# Patient Record
Sex: FEMALE | Race: WHITE | NOT HISPANIC OR LATINO | Employment: PART TIME | URBAN - METROPOLITAN AREA
[De-identification: names, ages, dates, MRNs, and addresses within clinical notes are randomized per-mention and may not be internally consistent; named-entity substitution may affect disease eponyms.]

---
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
## 2018-01-23 ENCOUNTER — OFFICE VISIT - HEALTHEAST (OUTPATIENT)
Dept: FAMILY MEDICINE | Facility: CLINIC | Age: 18
End: 2018-01-23

## 2018-01-23 DIAGNOSIS — R63.2 INCREASED APPETITE: ICD-10-CM

## 2018-01-23 DIAGNOSIS — F50.819 BINGE EATING DISORDER: ICD-10-CM

## 2018-01-23 DIAGNOSIS — Z79.899 ON SSRI THERAPY: ICD-10-CM

## 2018-01-23 ASSESSMENT — MIFFLIN-ST. JEOR: SCORE: 1291.59

## 2018-02-20 ENCOUNTER — OFFICE VISIT - HEALTHEAST (OUTPATIENT)
Dept: FAMILY MEDICINE | Facility: CLINIC | Age: 18
End: 2018-02-20

## 2018-02-20 DIAGNOSIS — F50.819 BINGE EATING DISORDER: ICD-10-CM

## 2018-02-20 DIAGNOSIS — F32.A DEPRESSION, UNSPECIFIED DEPRESSION TYPE: ICD-10-CM

## 2018-02-20 DIAGNOSIS — F41.9 ANXIETY: ICD-10-CM

## 2018-02-21 ENCOUNTER — AMBULATORY - HEALTHEAST (OUTPATIENT)
Dept: FAMILY MEDICINE | Facility: CLINIC | Age: 18
End: 2018-02-21

## 2018-02-21 DIAGNOSIS — B07.0 PLANTAR WART: ICD-10-CM

## 2018-02-22 ENCOUNTER — OFFICE VISIT - HEALTHEAST (OUTPATIENT)
Dept: PSYCHOLOGY | Facility: CLINIC | Age: 18
End: 2018-02-22

## 2018-02-22 DIAGNOSIS — F41.1 GENERALIZED ANXIETY DISORDER: ICD-10-CM

## 2018-02-22 DIAGNOSIS — F50.819 BINGE EATING DISORDER: ICD-10-CM

## 2018-06-12 ENCOUNTER — OFFICE VISIT - HEALTHEAST (OUTPATIENT)
Dept: FAMILY MEDICINE | Facility: CLINIC | Age: 18
End: 2018-06-12

## 2018-06-12 DIAGNOSIS — B07.0 PLANTAR WARTS: ICD-10-CM

## 2018-06-12 ASSESSMENT — MIFFLIN-ST. JEOR: SCORE: 1346.02

## 2018-08-28 ENCOUNTER — OFFICE VISIT - HEALTHEAST (OUTPATIENT)
Dept: FAMILY MEDICINE | Facility: CLINIC | Age: 18
End: 2018-08-28

## 2018-08-28 DIAGNOSIS — L70.9 ACNE: ICD-10-CM

## 2018-08-28 DIAGNOSIS — F41.9 ANXIETY: ICD-10-CM

## 2018-08-28 DIAGNOSIS — R45.86 MOOD SWINGS: ICD-10-CM

## 2018-08-28 DIAGNOSIS — B07.0 PLANTAR WARTS: ICD-10-CM

## 2018-08-28 DIAGNOSIS — F32.A DEPRESSION, UNSPECIFIED DEPRESSION TYPE: ICD-10-CM

## 2018-08-28 DIAGNOSIS — Z30.09 ENCOUNTER FOR OTHER GENERAL COUNSELING OR ADVICE ON CONTRACEPTION: ICD-10-CM

## 2018-08-28 ASSESSMENT — MIFFLIN-ST. JEOR: SCORE: 1346.02

## 2018-09-20 ENCOUNTER — AMBULATORY - HEALTHEAST (OUTPATIENT)
Dept: FAMILY MEDICINE | Facility: CLINIC | Age: 18
End: 2018-09-20

## 2018-09-20 DIAGNOSIS — N30.01 ACUTE CYSTITIS WITH HEMATURIA: ICD-10-CM

## 2018-09-20 DIAGNOSIS — Z72.51 UNPROTECTED SEX: ICD-10-CM

## 2018-09-20 DIAGNOSIS — R82.90 CLOUDY URINE: ICD-10-CM

## 2018-09-20 DIAGNOSIS — B07.0 PLANTAR WARTS: ICD-10-CM

## 2018-09-20 DIAGNOSIS — Z23 NEED FOR VACCINATION: ICD-10-CM

## 2018-09-20 DIAGNOSIS — N89.8 VAGINAL DISCHARGE: ICD-10-CM

## 2018-09-20 LAB
ALBUMIN UR-MCNC: ABNORMAL MG/DL
APPEARANCE UR: CLEAR
BILIRUB UR QL STRIP: NEGATIVE
CLUE CELLS: NORMAL
COLOR UR AUTO: YELLOW
GLUCOSE UR STRIP-MCNC: NEGATIVE MG/DL
HGB UR QL STRIP: ABNORMAL
KETONES UR STRIP-MCNC: NEGATIVE MG/DL
LEUKOCYTE ESTERASE UR QL STRIP: ABNORMAL
NITRATE UR QL: NEGATIVE
PH UR STRIP: 7 [PH] (ref 5–8)
SP GR UR STRIP: 1.02 (ref 1–1.03)
TRICHOMONAS, WET PREP: NORMAL
UROBILINOGEN UR STRIP-ACNC: ABNORMAL
YEAST, WET PREP: NORMAL

## 2018-09-20 ASSESSMENT — MIFFLIN-ST. JEOR: SCORE: 1341.49

## 2018-09-21 ENCOUNTER — AMBULATORY - HEALTHEAST (OUTPATIENT)
Dept: FAMILY MEDICINE | Facility: CLINIC | Age: 18
End: 2018-09-21

## 2018-09-21 DIAGNOSIS — N30.01 ACUTE CYSTITIS WITH HEMATURIA: ICD-10-CM

## 2018-09-23 LAB — BACTERIA SPEC CULT: ABNORMAL

## 2018-11-02 ENCOUNTER — OFFICE VISIT - HEALTHEAST (OUTPATIENT)
Dept: FAMILY MEDICINE | Facility: CLINIC | Age: 18
End: 2018-11-02

## 2018-11-02 DIAGNOSIS — N39.0 UTI (URINARY TRACT INFECTION): ICD-10-CM

## 2018-11-02 DIAGNOSIS — R39.15 URINARY URGENCY: ICD-10-CM

## 2018-11-02 LAB
ALBUMIN UR-MCNC: ABNORMAL MG/DL
AMORPH CRY #/AREA URNS HPF: ABNORMAL /[HPF]
APPEARANCE UR: ABNORMAL
BACTERIA #/AREA URNS HPF: ABNORMAL HPF
BILIRUB UR QL STRIP: NEGATIVE
COLOR UR AUTO: YELLOW
GLUCOSE UR STRIP-MCNC: NEGATIVE MG/DL
HGB UR QL STRIP: ABNORMAL
KETONES UR STRIP-MCNC: NEGATIVE MG/DL
LEUKOCYTE ESTERASE UR QL STRIP: ABNORMAL
MUCOUS THREADS #/AREA URNS LPF: ABNORMAL LPF
NITRATE UR QL: NEGATIVE
PH UR STRIP: 7 [PH] (ref 5–8)
RBC #/AREA URNS AUTO: ABNORMAL HPF
SP GR UR STRIP: 1.02 (ref 1–1.03)
SQUAMOUS #/AREA URNS AUTO: ABNORMAL LPF
UROBILINOGEN UR STRIP-ACNC: ABNORMAL
WBC #/AREA URNS AUTO: ABNORMAL HPF

## 2018-11-04 LAB — BACTERIA SPEC CULT: ABNORMAL

## 2018-11-13 ENCOUNTER — COMMUNICATION - HEALTHEAST (OUTPATIENT)
Dept: FAMILY MEDICINE | Facility: CLINIC | Age: 18
End: 2018-11-13

## 2018-11-13 DIAGNOSIS — L70.9 ACNE: ICD-10-CM

## 2018-11-13 DIAGNOSIS — Z30.09 ENCOUNTER FOR OTHER GENERAL COUNSELING OR ADVICE ON CONTRACEPTION: ICD-10-CM

## 2018-11-13 DIAGNOSIS — R45.86 MOOD SWINGS: ICD-10-CM

## 2019-01-11 ENCOUNTER — OFFICE VISIT - HEALTHEAST (OUTPATIENT)
Dept: FAMILY MEDICINE | Facility: CLINIC | Age: 19
End: 2019-01-11

## 2019-01-11 DIAGNOSIS — N89.8 VAGINAL DISCHARGE: ICD-10-CM

## 2019-01-11 DIAGNOSIS — Z30.09 ENCOUNTER FOR OTHER GENERAL COUNSELING OR ADVICE ON CONTRACEPTION: ICD-10-CM

## 2019-01-11 LAB
CLUE CELLS: NORMAL
TRICHOMONAS, WET PREP: NORMAL
YEAST, WET PREP: NORMAL

## 2019-01-11 ASSESSMENT — MIFFLIN-ST. JEOR: SCORE: 1314.27

## 2019-01-14 LAB
C TRACH DNA SPEC QL PROBE+SIG AMP: NEGATIVE
N GONORRHOEA DNA SPEC QL NAA+PROBE: NEGATIVE

## 2019-04-15 ENCOUNTER — AMBULATORY - HEALTHEAST (OUTPATIENT)
Dept: FAMILY MEDICINE | Facility: CLINIC | Age: 19
End: 2019-04-15

## 2019-04-15 ENCOUNTER — COMMUNICATION - HEALTHEAST (OUTPATIENT)
Dept: FAMILY MEDICINE | Facility: CLINIC | Age: 19
End: 2019-04-15

## 2019-04-15 ENCOUNTER — OFFICE VISIT - HEALTHEAST (OUTPATIENT)
Dept: FAMILY MEDICINE | Facility: CLINIC | Age: 19
End: 2019-04-15

## 2019-04-15 DIAGNOSIS — J02.0 STREP THROAT: ICD-10-CM

## 2019-04-15 DIAGNOSIS — F50.819 BINGE EATING DISORDER: ICD-10-CM

## 2019-04-15 DIAGNOSIS — F41.9 ANXIETY: ICD-10-CM

## 2019-04-15 DIAGNOSIS — F32.A DEPRESSION, UNSPECIFIED DEPRESSION TYPE: ICD-10-CM

## 2019-04-15 DIAGNOSIS — J02.9 SORE THROAT: ICD-10-CM

## 2019-04-15 LAB — DEPRECATED S PYO AG THROAT QL EIA: ABNORMAL

## 2019-04-15 ASSESSMENT — MIFFLIN-ST. JEOR: SCORE: 1323.34

## 2019-11-10 ENCOUNTER — OFFICE VISIT (OUTPATIENT)
Dept: URGENT CARE | Facility: URGENT CARE | Age: 19
End: 2019-11-10
Payer: COMMERCIAL

## 2019-11-10 VITALS
TEMPERATURE: 98.3 F | RESPIRATION RATE: 18 BRPM | DIASTOLIC BLOOD PRESSURE: 80 MMHG | SYSTOLIC BLOOD PRESSURE: 110 MMHG | OXYGEN SATURATION: 99 % | HEART RATE: 89 BPM

## 2019-11-10 DIAGNOSIS — B08.4 HAND, FOOT AND MOUTH DISEASE: Primary | ICD-10-CM

## 2019-11-10 PROCEDURE — 99203 OFFICE O/P NEW LOW 30 MIN: CPT | Performed by: FAMILY MEDICINE

## 2019-11-10 NOTE — PATIENT INSTRUCTIONS
If you have any itching:  Consider using calamine lotion; antihistamines (benadryl is sedating and may be best for home/night time, claritin and zyrtec are non sedating); and, avoid hot water in your baths/showers.

## 2019-11-10 NOTE — PROGRESS NOTES
SUBJECTIVE:   Liv Pryor is a 19 year old female presenting with a chief complaint of itchy bumps to the hands, feet and a sore throat.    Started on 11/8 with sore throat and felt feverish.   Works in a .   No genital sores or lesions and no h/o syphilis.  Monogamous relationship.  No recent travels or tick bites.    ROS:  5 point review of symptoms negative other than positives stated above.    OBJECTIVE  /80 (BP Location: Right arm, Patient Position: Chair, Cuff Size: Adult Regular)   Pulse 89   Temp 98.3  F (36.8  C) (Oral)   Resp 18   SpO2 99%   GENERAL:  Awake, alert and interactive. No acute distress.  HEAD:  NC/AT, EOMI, conjunctiva clear, nose clear, oropharynx with raised red lesions to soft palate and posterior oropharynx.  No exudate.   SKIN:  Raised red papular lesions to palms and posterior hand and fingers and tops of the feet.   No pustules or vesicles.    ASSESSMENT/PLAN    ICD-10-CM    1. Hand, foot and mouth disease B08.4      We discussed the expected course of the infection and symptomatic cares in detail.   Advised to return to care if symptoms not improving as expected, do not resolve completely, or if any new or worsening symptoms develop.

## 2020-02-12 ENCOUNTER — OFFICE VISIT (OUTPATIENT)
Dept: URGENT CARE | Facility: URGENT CARE | Age: 20
End: 2020-02-12
Payer: COMMERCIAL

## 2020-02-12 VITALS
TEMPERATURE: 97.7 F | DIASTOLIC BLOOD PRESSURE: 60 MMHG | OXYGEN SATURATION: 98 % | HEART RATE: 98 BPM | SYSTOLIC BLOOD PRESSURE: 110 MMHG

## 2020-02-12 DIAGNOSIS — R07.0 THROAT PAIN: Primary | ICD-10-CM

## 2020-02-12 DIAGNOSIS — J02.0 STREP THROAT: ICD-10-CM

## 2020-02-12 PROBLEM — Z72.51 UNPROTECTED SEX: Status: ACTIVE | Noted: 2018-09-20

## 2020-02-12 PROBLEM — B07.0 PLANTAR WARTS: Status: ACTIVE | Noted: 2018-06-13

## 2020-02-12 PROBLEM — N91.2 AMENORRHEA: Status: ACTIVE | Noted: 2017-10-29

## 2020-02-12 PROBLEM — M54.50 ACUTE BILATERAL LOW BACK PAIN WITHOUT SCIATICA: Status: ACTIVE | Noted: 2017-10-29

## 2020-02-12 PROBLEM — R45.86 MOOD SWINGS: Status: ACTIVE | Noted: 2017-10-29

## 2020-02-12 PROBLEM — L70.9 ACNE: Status: ACTIVE | Noted: 2018-08-28

## 2020-02-12 PROBLEM — Z30.9 CONTRACEPTIVE MANAGEMENT: Status: ACTIVE | Noted: 2018-08-28

## 2020-02-12 PROBLEM — R82.90 CLOUDY URINE: Status: ACTIVE | Noted: 2018-09-20

## 2020-02-12 PROBLEM — M21.41 FLAT FEET, BILATERAL: Status: ACTIVE | Noted: 2017-10-29

## 2020-02-12 PROBLEM — R53.83 FATIGUE: Status: ACTIVE | Noted: 2017-10-29

## 2020-02-12 PROBLEM — F32.A DEPRESSED: Status: ACTIVE | Noted: 2017-10-29

## 2020-02-12 PROBLEM — F50.819 BINGE EATING DISORDER: Status: ACTIVE | Noted: 2018-01-23

## 2020-02-12 PROBLEM — R63.2 INCREASED APPETITE: Status: ACTIVE | Noted: 2018-01-23

## 2020-02-12 PROBLEM — Z79.899 ON SSRI THERAPY: Status: ACTIVE | Noted: 2018-01-23

## 2020-02-12 PROBLEM — M21.42 FLAT FEET, BILATERAL: Status: ACTIVE | Noted: 2017-10-29

## 2020-02-12 PROBLEM — F41.9 ANXIETY: Status: ACTIVE | Noted: 2017-10-29

## 2020-02-12 LAB
DEPRECATED S PYO AG THROAT QL EIA: ABNORMAL
SPECIMEN SOURCE: ABNORMAL

## 2020-02-12 PROCEDURE — 99213 OFFICE O/P EST LOW 20 MIN: CPT | Performed by: FAMILY MEDICINE

## 2020-02-12 PROCEDURE — 87880 STREP A ASSAY W/OPTIC: CPT | Performed by: FAMILY MEDICINE

## 2020-02-12 RX ORDER — AMOXICILLIN 875 MG
875 TABLET ORAL 2 TIMES DAILY
Qty: 20 TABLET | Refills: 0 | Status: SHIPPED | OUTPATIENT
Start: 2020-02-12 | End: 2020-02-22

## 2020-02-12 NOTE — PROGRESS NOTES
SUBJECTIVE:  Chief Complaint   Patient presents with     Urgent Care     Pharyngitis     Possible strep started last night- fever, sore throat, chills, HA, bilateral ear pain     Liv Pryor is a 19 year old female with a chief complaint of sore throat.  Onset of symptoms was 1 day(s) ago.    Course of illness: sudden onset, still present and constant.  Severity moderate  Current and Associated symptoms: ear pain bilateral, sore throat, headache and body aches  Treatment measures tried include Tylenol/Ibuprofen.  Predisposing factors include - works at - many children have been sick.    Declines influenza testing    No past medical history on file.  Patient Active Problem List   Diagnosis     Acne     Acute bilateral low back pain without sciatica     Amenorrhea     Anxiety     Binge eating disorder     Cloudy urine     Contraceptive management     Depressed     Fatigue     Flat feet, bilateral     Increased appetite     Mood swings     On SSRI therapy     Plantar warts     Unprotected sex         ALLERGIES:  Patient has no known allergies.    MEDs  No current outpatient medications on file prior to visit.  No current facility-administered medications on file prior to visit.       Social History     Tobacco Use     Smoking status: Former Smoker     Packs/day: 0.00     Types: Cigarettes     Smokeless tobacco: Never Used   Substance Use Topics     Alcohol use: Not on file     Family History:  Non-contributory,  No associated family health conditions    ROS:  CONSTITUTIONAL:  fever, chills,   INTEGUMENTARY/SKIN: NEGATIVE for worrisome rashes, or lesions  EYES: NEGATIVE for vision changes or irritation  GI: NEGATIVE for nausea, abdominal pain,  or change in bowel habits    OBJECTIVE:   /60 (BP Location: Right arm, Patient Position: Chair, Cuff Size: Adult Regular)   Pulse 98   Temp 97.7  F (36.5  C) (Tympanic)   SpO2 98%   GENERAL APPEARANCE: alert, moderate distress and cooperative  EYES: EOMI,   PERRL, conjunctiva clear  HENT: ear canals and TM's normal.  Nose normal.  Pharynx erythematous with some exudate noted.  NECK: supple, non-tender to palpation, no adenopathy noted  RESP: lungs clear to auscultation - no rales, rhonchi or wheezes  CV: regular rates and rhythm, normal S1 S2, no murmur noted  ABDOMEN:  soft, nontender, no HSM or masses and bowel sounds normal  SKIN: no suspicious lesions or rashes    Rapid Strep test is positive    ASSESSMENT:  Throat pain     - Rapid strep screen    Strep throat     - amoxicillin (AMOXIL) 875 MG tablet; Take 1 tablet (875 mg) by mouth 2 times daily for 10 days     Patient was counseled that to prevent spreading the strep infection that she should stay out of public places, work or school until she has completed 24 hours of antibiotic treatment     Symptomatic treat with gargles, lozenges, and OTC analgesic as needed. Follow-up with primary clinic if not improving.    Note for work

## 2020-02-12 NOTE — LETTER
[facility]  [address]  [facility] [address]  [phone]      February 12, 2020    RE:  Liv Pryor                                                                                                                                                       [id number] [facility] [address]            To whom it may concern:    Liv Pryor is under my professional care for    Throat pain  Strep throat.   She  may return to work with the following: No restrictions on or about 2/14/2020.          Sincerely,        Jinny Hathaway MD    [facility]

## 2020-02-12 NOTE — PATIENT INSTRUCTIONS
Patient Education     Pharyngitis: Strep (Confirmed)    You have had a positive test for strep throat. Strep throat is a contagious illness. It is spread by coughing, kissing or by touching others after touching your mouth or nose. Symptoms include throat pain that is worse with swallowing, aching all over, headache, and fever. It is treated with antibiotic medicine. This should help you start to feel better in 1 to 2 days.  Home care    Rest at home. Drink plenty of fluids to you won't get dehydrated.    No work or school for the first 2 days of taking the antibiotics. After this time, you will not be contagious. You can then return to school or work if you are feeling better.     Take antibiotic medicine for the full 10 days, even if you feel better. This is very important to ensure the infection is treated. It is also important to prevent medicine-resistant germs from developing. If you were given an antibiotic shot, you don't need any more antibiotics.    You may use acetaminophen or ibuprofen to control pain or fever, unless another medicine was prescribed for this. Talk with your healthcare provider before taking these medicines if you have chronic liver or kidney disease. Also talk with your healthcare provider if you have had a stomach ulcer or GI bleeding.    Throat lozenges or sprays help reduce pain. Gargling with warm saltwater will also reduce throat pain. Dissolve 1/2 teaspoon of salt in 1 glass of warm water. This may be useful just before meals.     Soft foods are OK. Don't eat salty or spicy foods.  Follow-up care  Follow up with your healthcare provider or our staff if you don't get better over the next week.  When to seek medical advice  Call your healthcare provider right away if any of these occur:    Fever of 100.4 F (38 C) or higher, or as directed by your healthcare provider    New or worsening ear pain, sinus pain, or headache    Painful lumps in the back of neck    Stiff neck    Lymph  nodes getting larger or becoming soft in the middle    You can't swallow liquids or you can't open your mouth wide because of throat pain    Signs of dehydration. These include very dark urine or no urine, sunken eyes, and dizziness.    Trouble breathing or noisy breathing    Muffled voice    Rash  Prevention  Here are steps you can take to help prevent an infection:    Keep good hand washing habits.    Don t have close contact with people who have sore throats, colds, or other upper respiratory infections.    Don t smoke, and stay away from secondhand smoke.  Date Last Reviewed: 11/1/2017 2000-2019 [employer]. [address] [place] [address], [place] [address]. All rights reserved. This information is not intended as a substitute for professional medical care. Always follow your healthcare professional's instructions.

## 2021-05-27 NOTE — PROGRESS NOTES
"1. Anxiety     2. Depression, unspecified depression type     3. Binge eating disorder     4. Sore throat  Rapid Strep A Screen- Throat Swab     PHQ-9: 14  ANGIE-7: 9    ASSESSMENT/PLAN:     The following are part of a depression follow up plan for the patient:  coping support assessment and emotional support assessment  The following high BMI interventions were performed this visit: encouragement to exercise and weight monitoring    1. Anxiety    -Continues to decline medication or counseling at this time    2. Depression, unspecified depression type    -Continues to decline medication or counseling at this time    3. Binge eating disorder    -Patient will start tracking the number of days a week she is binging, goal is to work on self-control around her trigger foods.    4. Sore throat    - Rapid Strep A Screen- Throat Swab      There are no Patient Instructions on file for this visit.  Medications Discontinued During This Encounter   Medication Reason     norgestimate-ethinyl estradiol (ESTARYLLA) 0.25-35 mg-mcg per tablet Therapy completed     Return in about 2 months (around 6/15/2019) for anxiety, depression. binge eating follow up      Maira Byrd NP          SUBJECTIVE:  Liv Pryor is a 18 y.o. female who presents for anxiety, depression and to discuss her binge eating.  She also reports sore throat today    Stressors include work and school.  She is working on work life balance.  She is working at a  and is working anywhere from 15-35 hours/week.  She is done with school for the summer in 3 weeks and is happy about this.  She continues to live at home with her parents.  She is no longer taking Prozac or [place] pram for her anxiety or depressive or binge eating disorder symptoms.  She felt like \"they did nothing for me \".  She has always been hesitant to attend counseling since I started caring for her close to 2 years ago, she states \"it just does not work with my schedule \".  Coping " "strategies include overeating, she finds herself binge eating again 1-3 times per week since stopping the medication.  She will eat to the point where physically makes her ill, she does not purge.  She has been seen and counseled by providers at the [facility] in the past but she is stopped going due to the intensity of the scheduling for behavioral appointments.  She felt that her binge eating was not to the point of where it was so extreme that she needed the counseling of the [facility].  She does not purge, she is just struggling with controlling her impulses around foods such as chips or sweets.  Is no longer exercising as much and needs to get back on track with this.  In the past, she has track her calories but finds that it is boring and has become more difficult for her because she is so on interested in doing so even though it does help her understand how much she is actually eating in a day.  Finds that she does binge eat on her days off of work more than the days she works.  Additional symptoms include chronic worry and feeling bad about herself.  We discussed goals of her anxiety and depression.  She is willing to start tracking how many days of the week she is binging and will work on self-control her on her trigger foods.  She states \"it is hard for me to control myself though when I am not buying the groceries because my mom buys all the foods that I like \".  Recommend that she think long and hard about going back to seeing a counselor as I feel it would be beneficial for her to learn some coping strategies and behavioral tactics in order to control her binge eating disorder.  In addition, this will also help manage her anxiety and depression off of the medication as she desires not to return back to the medication at this time.    Sore throat: Onset: 4 days.  Denies fever, chills, nausea or vomiting, no reports of diarrhea.  No recent travel, she is a non-smoker but she does work at a  " and is exposed to strep throat there.   Chief Complaint   Patient presents with     Follow-up     Med Check -          Patient Active Problem List   Diagnosis     Amenorrhea     Anxiety     Depressed     Mood swings     Flat feet, bilateral     Fatigue, unspecified type     Acute bilateral low back pain without sciatica     Binge eating disorder     On SSRI therapy     Increased appetite     Plantar warts     Encounter for other general counseling or advice on contraception     Acne     Vaginal discharge     Unprotected sex     Cloudy urine       No current outpatient medications on file.     No current facility-administered medications for this visit.        Social History     Tobacco Use   Smoking Status Never Smoker   Smokeless Tobacco Never Used       REVIEW OF SYSTEMS: Positive for mood swings, excessive sadness,  fatigue, anhedonia, irritability          TOBACCO USE:  Social History     Tobacco Use   Smoking Status Never Smoker   Smokeless Tobacco Never Used     Social History     Socioeconomic History     Marital status: Single     Spouse name: Not on file     Number of children: Not on file     Years of education: 13     Highest education level: Not on file   Occupational History     Occupation: student   Social Needs     Financial resource strain: Not on file     Food insecurity:     Worry: Not on file     Inability: Not on file     Transportation needs:     Medical: Not on file     Non-medical: Not on file   Tobacco Use     Smoking status: Never Smoker     Smokeless tobacco: Never Used   Substance and Sexual Activity     Alcohol use: No     Drug use: No     Sexual activity: Never     Comment: has never been sexually active   Lifestyle     Physical activity:     Days per week: Not on file     Minutes per session: Not on file     Stress: Not on file   Relationships     Social connections:     Talks on phone: Not on file     Gets together: Not on file     Attends [employer] service: Not on file     Active  member of club or organization: Not on file     Attends meetings of clubs or organizations: Not on file     Relationship status: Not on file     Intimate partner violence:     Fear of current or ex partner: Not on file     Emotionally abused: Not on file     Physically abused: Not on file     Forced sexual activity: Not on file   Other Topics Concern     Not on file   Social History Narrative     Not on file         OBJECTIVE:            Vitals:    04/15/19 0904   BP: 92/56   Pulse: 70   Resp: 12   Temp: 97.5  F (36.4  C)   SpO2: 99%     Weight: 138 lb (62.6 kg)    Wt Readings from Last 3 Encounters:   04/15/19 138 lb (62.6 kg)   01/11/19 136 lb (61.7 kg)   11/02/18 144 lb (65.3 kg)     Body mass index is 26.07 kg/m .        Physical Exam:  GENERAL APPEARANCE: A&A, NAD, well hydrated, well nourished, well groomed   HEAD: atraumatic, no deformity  EYES: PERRL, EOM's intact, no redness or swelling  EARS: TM's normal, gray with nl light reflex  NOSE: no post nasal drainage or thrush  MOUTH: without erythema, exudate or thrush  NECK: Supple, without lymphadenopathy, no thyroid mass, no JVD, no bruit  CV: RRR, no M/G/R   LUNGS: CTAB, normal respiratory effort  ABDOMEN: S&NT, no masses, no organomegaly, BS present x4   SKIN:  Normal skin turgor, no lesions/rashes, warm and dry   PSYCHIATRIC; flat affect, intermittent eye contact, difficult time expressing herself, memory intact

## 2021-05-27 NOTE — TELEPHONE ENCOUNTER
Patient informed of positive strep test today.  Antibiotics sent to her preferred pharmacy, we did discuss infection control, work note written and left at  for her.

## 2021-05-30 ENCOUNTER — RECORDS - HEALTHEAST (OUTPATIENT)
Dept: ADMINISTRATIVE | Facility: CLINIC | Age: 21
End: 2021-05-30

## 2021-05-31 VITALS — WEIGHT: 130 LBS | HEIGHT: 61 IN | BODY MASS INDEX: 24.55 KG/M2

## 2021-05-31 VITALS — HEIGHT: 61 IN | BODY MASS INDEX: 24.73 KG/M2 | WEIGHT: 131 LBS

## 2021-05-31 VITALS — WEIGHT: 127 LBS | HEIGHT: 61 IN | BODY MASS INDEX: 23.98 KG/M2

## 2021-05-31 VITALS — HEIGHT: 61 IN | BODY MASS INDEX: 24.17 KG/M2 | WEIGHT: 128 LBS

## 2021-06-01 VITALS — WEIGHT: 143 LBS | BODY MASS INDEX: 27 KG/M2 | HEIGHT: 61 IN

## 2021-06-01 VITALS — BODY MASS INDEX: 25.34 KG/M2 | WEIGHT: 134.1 LBS

## 2021-06-01 VITALS — WEIGHT: 132 LBS | BODY MASS INDEX: 24.94 KG/M2

## 2021-06-02 VITALS — HEIGHT: 61 IN | BODY MASS INDEX: 26.81 KG/M2 | WEIGHT: 142 LBS

## 2021-06-02 VITALS — BODY MASS INDEX: 25.68 KG/M2 | HEIGHT: 61 IN | WEIGHT: 136 LBS

## 2021-06-02 VITALS — WEIGHT: 144 LBS | BODY MASS INDEX: 27.21 KG/M2

## 2021-06-02 VITALS — HEIGHT: 61 IN | WEIGHT: 138 LBS | BODY MASS INDEX: 26.06 KG/M2

## 2021-06-13 NOTE — PROGRESS NOTES
Office Visit - New Patient   Liv Pryor   17 y.o.  female    Date of visit: 10/26/2017  Physician: Maira Byrd CNP     Assessment and Plan   1. Establishing care with new doctor, encounter for     2. Amenorrhea  Cortisol    Thyroid Stimulating Hormone (TSH)    Luteinizing Hormone (LH)    Follicle Stimulating Hormone (FSH)    Prolactin    Dehydroepiandrosterone Sulfate(DHEA-S)    Comprehensive Metabolic Panel   3. Anxiety  escitalopram oxalate (LEXAPRO) 10 MG tablet   4. Depressed  escitalopram oxalate (LEXAPRO) 10 MG tablet   5. Mood swings     6. Flat feet, bilateral  Ambulatory referral for Orthotics - [place]   7. Fatigue, unspecified type  Thyroid Stimulating Hormone (TSH)    Hemoglobin    Vitamin D, Total (25-Hydroxy)   8. Acute bilateral low back pain without sciatica  Urinalysis   9. Need for vaccination  Influenza, Seasonal,Quad Inj, 36+ MOS    CANCELED: Influenza, Seasonal,Quad Inj, 36+ MOS     Follow up in 4 weeks for anxiety and depression check. Patient initiated on Lexapro therapy today. Will notify her of any grossly abnormal labs by phone.     Body Mass Index was not assessed due to normal BMI.         Chief Complaint   Chief Complaint   Patient presents with     Menstrual Problem     missed periods, referral for orthodics and discuss mood swings     Establish Care     Transfer from [facility]        Patient Profile   Social History     Social History Narrative        Past Medical History   Patient Active Problem List   Diagnosis     Amenorrhea     Anxiety     Depressed     Mood swings     Flat feet, bilateral     Fatigue, unspecified type     Acute bilateral low back pain without sciatica       Past Surgical History  She has a past surgical history that includes none.     History of Present Illness   This 17 y.o. old who presents to establish care today.  Her mother is present during the visit.  Medical, family and surgical history reviewed with patient.  Medication list  "reconciled.  Patient is a recent high school graduate and is feeling increased stress and anxiety over the last 8 months because she is not sure what she wants to do for a career.  She has been having mood swings and her main concern is that she has not had a period in roughly 2 years.  She was previously taking MoNessa for regulating her periods, however she went off of this because of weight gain.  She has not had a period since going off of the oral contraceptive 2 years ago.  She is not sexually active nor has she ever been.  She has not experienced any drastic changes in her weight.  She denies any history of anorexia nervosa or bulimia.  She does work at the [employer] part-time as a  from 7:30 in the morning to 1 PM.  She is a full-time student and is taking her generals right now.  She has been experiencing dizziness, hot flashes and increased fatigue.  During our conversation, I asked her about anxiety and depression issues and she broke down and started crying.  She denies any thoughts of self-harm and she is requesting help with her anxiety and depression today because she is feeling overwhelmed with it all.   Her edson 7 score is 15 and PHQ 9 score 14 today.  We reviewed all of the available medications for treating anxiety and depression.  She is not interested in counseling at this time.  She would like to start medication and see how she feels.  She is requesting a referral to orthotics for her bilateral for flat feet.  She admits to having some lower bilateral back pain, she has no history of kidney stones and denies any urinary frequency or dysuria.  Blood pressure is borderline hypotensive, she appears \"spacey\" today.  It appears that she has several thoughts going through her mind at one time and is having a problem expressing herself.  She does not drink alcohol, she does not smoke cigarettes and she denies any illicit drug use.  She tells me that she does exercise regularly and she " "typically eats between 1500 and 1800 diego per day. She is not diabetic and denies having any issues previously with her thyroid. Multiple lab tests will be performed today.     Review of Systems: A comprehensive review of systems was negative except as noted.     Medications and Allergies   Current Outpatient Prescriptions   Medication Sig Dispense Refill     escitalopram oxalate (LEXAPRO) 10 MG tablet Take 1 tablet (10 mg total) by mouth daily. 30 tablet 2     No current facility-administered medications for this visit.      No Known Allergies     Family and Social History   Family History   Problem Relation Age of Onset     No Medical Problems Mother      No Medical Problems Father      No Medical Problems Sister      No Medical Problems Brother         Social History   Substance Use Topics     Smoking status: Never Smoker     Smokeless tobacco: Never Used     Alcohol use No        Physical Exam   General Appearance:   Flat affect, tearful, no acute distress, alert and oriented ×4    BP (!) 80/62  Pulse 92  Temp 97.4  F (36.3  C)  Resp 14  Ht 5' 1\" (1.549 m)  Wt 130 lb (59 kg)  LMP  (Approximate)  SpO2 100%  Breastfeeding? No Comment: over 2 yrs having a period  BMI 24.56 kg/m2      NECK: Neck appearance was normal. There were no neck masses and the thyroid was not enlarged.  RESPIRATORY: Breathing pattern was normal and the chest moved symmetrically.  Percussion/auscultatory percussion was normal.  Lung sounds were normal and there were no abnormal sounds.  CARDIOVASCULAR: Heart rate and rhythm were normal.  S1 and S2 were normal and there were no extra sounds or murmurs. Peripheral pulses in arms and legs were normal.  Jugular venous pressure was normal.  There was no peripheral edema.  GASTROINTESTINAL: The abdomen was normal in contour.  Bowel sounds were present.  Percussion detected no organ enlargement or tenderness.  Palpation detected no tenderness, mass, or enlarged organs.   PSYCHIATRIC:  Mood " and affect were normal and the patient had normal recent and remote memory. The patient's judgment and insight were normal. Delayed verbal responses due to feeling overwhelmed. Difficulty expressing herself today.          Additional Information   Review and/or order of clinical lab tests:  Review and/or order of radiology tests:  Review and/or order of medicine tests:  Discussion of test results with performing physician:  Decision to obtain old records and/or obtain history from someone other than the patient:  Review and summarization of old records and/or obtaining history from someone other than the patient and.or discussion of case with another health care provider:  Independent visualization of image, tracing or specimen itself:    Time: total time spent with the patient was 45 minutes of which >50% was spent in counseling and coordination of care     Maira Byrd, CNP  Family Nurse Practitioner  [facility]  [phone]

## 2021-06-14 NOTE — PROGRESS NOTES
"1. Anxiety  escitalopram oxalate (LEXAPRO) 10 MG tablet   2. Depressed  escitalopram oxalate (LEXAPRO) 10 MG tablet   3. Amenorrhea  Ambulatory referral to Obstetrics / Gynecology     Med list reconciled    ASSESSMENT/PLAN:     The following are part of a depression follow up plan for the patient:  coping support assessment and emotional support assessment    1. Anxiety    -ANGIE-7: 13  - escitalopram oxalate (LEXAPRO) 10 MG tablet; Take 2 tablets (20 mg total) by mouth daily.  Dispense: 60 tablet; Refill: 2    2. Depressed    -PHQ-9: 16  - escitalopram oxalate (LEXAPRO) 10 MG tablet; Take 2 tablets (20 mg total) by mouth daily.  Dispense: 60 tablet; Refill: 2    3. Amenorrhea    - Ambulatory referral to Obstetrics / Gynecology    There are no Patient Instructions on file for this visit.  Medications Discontinued During This Encounter   Medication Reason     escitalopram oxalate (LEXAPRO) 10 MG tablet Reorder     Return to clinic in 4 weeks for next anxiety and depression follow-up.  Patient's Lexapro dose increased to 20 mg daily today.  The visit lasted a total of 20 minutes face to face with the patient.  Over 50% of the time spent counseling and educating the patient about above content.      Maira Byrd NP          SUBJECTIVE:  Liv Pryor is a 17 y.o. female who presents for anxiety and depression follow-up since starting Lexapro medication.  She continues to experience chronic worry, increased insecurities, depression, increased anxiety and is feeling frustrated about where she is at life.  She has no thoughts of self-harm.  Patient graduated high school and is struggling to figure out what she would like to do for a career long term.  I did explain to her that she does not need to have all of this figured out right now.  We discussed introducing therapy sessions into her regimen, patient refused to see a therapist at this time because she states she is \"too stubborn \"to listen to any of the " recommendations.  She did express her concern regarding her inattentiveness, fatigue and lack of drive.  Explained to patient that she should be evaluated by OB/GYN to figure out why she is not having periods.  Also informed  her that I would like to avoid any medication such as Adderall for her inattention.  She did find her old MonoNessa prescription at home, it has been 1 year since her last period, not 2 years as she had expressed in her recent visit with me.  Regardless of when her last period was,  I explained to her that we still need to figure out why she is not having a period every month.  Most of her lab work that was previously performed did come back with normal findings.  Patient did agree to increasing her Lexapro dose to 20 mg daily today.  She will return to the clinic in 4 weeks for her next follow-up.  Chief Complaint   Patient presents with     Follow-up     anxiety          Patient Active Problem List   Diagnosis     Amenorrhea     Anxiety     Depressed     Mood swings     Flat feet, bilateral     Fatigue, unspecified type     Acute bilateral low back pain without sciatica       Current Outpatient Prescriptions   Medication Sig Dispense Refill     escitalopram oxalate (LEXAPRO) 10 MG tablet Take 2 tablets (20 mg total) by mouth daily. 60 tablet 2     No current facility-administered medications for this visit.        History   Smoking Status     Never Smoker   Smokeless Tobacco     Never Used       REVIEW OF SYSTEMS: Excessive worries, restlessness, on edge, easily fatigues, irritability, disturbed sleep, decreased concentration, muscle tension, fear of health.      TOBACCO USE:  History   Smoking Status     Never Smoker   Smokeless Tobacco     Never Used     Social History     Social History     Marital status: Single     Spouse name: N/A     Number of children: N/A     Years of education: 13     Occupational History     student      Social History Main Topics     Smoking status: Never Smoker      Smokeless tobacco: Never Used     Alcohol use No     Drug use: No     Sexual activity: No      Comment: has never been sexually active     Other Topics Concern     Not on file     Social History Narrative         OBJECTIVE:            Vitals:    11/28/17 1151   BP: 90/60   Pulse: (!) 58   Resp: 14   Temp: 97.7  F (36.5  C)   SpO2: 100%     Weight: 128 lb (58.1 kg)  Wt Readings from Last 3 Encounters:   11/28/17 128 lb (58.1 kg) (52 %, Z= 0.04)*   10/26/17 130 lb (59 kg) (56 %, Z= 0.15)*     * Growth percentiles are based on [facility] 2-20 Years data.     Body mass index is 24.19 kg/(m^2).        Physical Exam:  GENERAL APPEARANCE: A&A, NAD, well hydrated, well nourished  NECK: Supple, without lymphadenopathy, no thyroid mass, no JVD, no bruit  CV: RRR, no M/G/R   LUNGS: CTAB, normal respiratory effort  ABDOMEN: S&NT, no masses, no organomegaly, BS present x4   SKIN:  Normal skin turgor, no lesions/rashes , warm and dry  PSYCHIATRIC;  memory intact, flat affect, wide-eyed, delayed responses, eye contact, difficulty expressing how she feels

## 2021-06-15 NOTE — PROGRESS NOTES
"1. Increased appetite     2. On SSRI therapy     3. Binge eating disorder  FLUoxetine (PROZAC) 20 MG capsule     Med list reconciled    ASSESSMENT/PLAN:     Body Mass Index was not assessed due to normal BMI.    1. Increased appetite      2. On SSRI therapy      3. Binge eating disorder    - FLUoxetine (PROZAC) 20 MG capsule; Take 1 capsule (20 mg total) by mouth daily.  Dispense: 30 capsule; Refill: 2    There are no Patient Instructions on file for this visit.  Medications Discontinued During This Encounter   Medication Reason     escitalopram oxalate (LEXAPRO) 10 MG tablet Therapy completed     No Follow-up on file.    The visit lasted a total of 25 minutes face to face with the patient.  Over 50% of the time spent counseling and educating the patient about above content.      Maira Byrd NP          SUBJECTIVE:  Liv Pryor is a 17 y.o. female who presents to discuss her overeating habits.  Onset: Childhood.  Patient continues to have spurts 1-3 days per week where she will eat excess amount of food until she feels physically ill.  Typically, on an average day, she eats breakfast lunch and dinner with 2 snacks in between.  She does not throw up, she has no history of anorexia or bulimia.  She states on average, she does binge eat roughly 1 day per week with the highest number being 3 days per week.  She typically eats all of her meals at the table or in her bedroom.  As a child, she does remember hiding and hoarding food.  She does not feel that her binge eating episodes are related to how she is feeling in regards to her anxiety and depression, she tells me that she just does not know when she should stop eating and that she \"just really enjoys eating\".  She feels obligated to finish an entire meal even if the servings on the box state it is for 4 people.  Her mother has been increasigly irritated with her because she eats such large quantities of food after her mother has just been grocery " shopping.  She does not purposely induce vomiting, nor has she vomited post-meal up to this point.  We did discuss options for treating binge eating disorders, she is currently on an SSRI at this time (Lexapro 10 mg daily).  Research does suggest Prozac.  Patient is open to trying a new medication today to see if this reduces the number of binging episodes that she has been experiencing. She continues to refuse nutrition therapy or mental health counseling at this time.  She states she has seen a nutritionist in the past for her binge eating.  Weight is stable today, she has gained 3 pounds since being on SSRI therapy.  She is not exercising. All other vitals are within normal range. Follow up in 4 weeks to evaluate effectiveness of Prozac medication, will re-introduce counseling at follow up visit.   Chief Complaint   Patient presents with     Follow-up     Constant hunger even after being full - ongoing for awhile         Patient Active Problem List   Diagnosis     Amenorrhea     Anxiety     Depressed     Mood swings     Flat feet, bilateral     Fatigue, unspecified type     Acute bilateral low back pain without sciatica     Binge eating disorder     On SSRI therapy     Increased appetite       Current Outpatient Prescriptions   Medication Sig Dispense Refill     norgestimate-ethinyl estradiol (MONONESSA, 28,) 0.25-35 mg-mcg per tablet Take 1 tablet by mouth daily. 84 tablet 3     FLUoxetine (PROZAC) 20 MG capsule Take 1 capsule (20 mg total) by mouth daily. 30 capsule 2     No current facility-administered medications for this visit.        History   Smoking Status     Never Smoker   Smokeless Tobacco     Never Used       REVIEW OF SYSTEMS: Excessive worries, restlessness, on edge, easily fatigues, irritability, disturbed sleep, decreased concentration, muscle tension.      TOBACCO USE:  History   Smoking Status     Never Smoker   Smokeless Tobacco     Never Used     Social History     Social History     Marital  status: Single     Spouse name: N/A     Number of children: N/A     Years of education: 13     Occupational History     student      Social History Main Topics     Smoking status: Never Smoker     Smokeless tobacco: Never Used     Alcohol use No     Drug use: No     Sexual activity: No      Comment: has never been sexually active     Other Topics Concern     Not on file     Social History Narrative         OBJECTIVE:            Vitals:    01/23/18 1225   BP: 106/70   Pulse: 84   Resp: 14   Temp: 97.9  F (36.6  C)   SpO2: 99%     Weight: 131 lb (59.4 kg)  Wt Readings from Last 3 Encounters:   01/23/18 131 lb (59.4 kg) (57 %, Z= 0.16)*   12/26/17 127 lb (57.6 kg) (50 %, Z= -0.01)*   11/28/17 128 lb (58.1 kg) (52 %, Z= 0.04)*     * Growth percentiles are based on [facility] 2-20 Years data.     Body mass index is 24.75 kg/(m^2).        Physical Exam:  GENERAL APPEARANCE: A&A, NAD, well hydrated, well nourished  NECK: Supple, without lymphadenopathy, no thyroid mass, no JVD, no bruit  CV: RRR, no M/G/R   LUNGS: CTAB, normal respiratory effort  ABDOMEN: S&NT, no masses, no organomegaly, BS present x4   SKIN:  Normal skin turgor, no lesions/rashes, warm and dry   PSYCHIATRIC;  Mood appropriate, memory intact, good eye contact, engaged in conversation

## 2021-06-15 NOTE — PROGRESS NOTES
1. Anxiety  escitalopram oxalate (LEXAPRO) 10 MG tablet   2. Depressed  escitalopram oxalate (LEXAPRO) 10 MG tablet   3. Encounter for menstrual regulation  norgestimate-ethinyl estradiol (MONONESSA, 28,) 0.25-35 mg-mcg per tablet     Med list reconciled    ASSESSMENT/PLAN:     The following are part of a depression follow up plan for the patient:  coping support assessment and emotional support assessment    1. Anxiety    - escitalopram oxalate (LEXAPRO) 10 MG tablet; Take 1 tablet (10 mg total) by mouth daily.  Dispense: 90 tablet; Refill: 1    2. Depressed    - escitalopram oxalate (LEXAPRO) 10 MG tablet; Take 1 tablet (10 mg total) by mouth daily.  Dispense: 90 tablet; Refill: 1    3. Encounter for menstrual regulation    - norgestimate-ethinyl estradiol (MONONESSA, 28,) 0.25-35 mg-mcg per tablet; Take 1 tablet by mouth daily.  Dispense: 84 tablet; Refill: 3    There are no Patient Instructions on file for this visit.  Medications Discontinued During This Encounter   Medication Reason     escitalopram oxalate (LEXAPRO) 10 MG tablet Dose adjustment     escitalopram oxalate (LEXAPRO) 10 MG tablet Reorder     Return to clinic in 6 months for next anxiety and depression follow-up.  Patient will continue on the 10 mg dose of Lexapro daily.  The visit lasted a total of 25 minutes face to face with the patient.  Over 50% of the time spent counseling and educating the patient about above content.      Maira Byrd NP          SUBJECTIVE:  Liv Pryor is a 17 y.o. female presents for follow-up for her anxiety and depression since initiating Lexapro therapy.  Patient was placed initially on 10 mg of Lexapro over 8 weeks ago.  She did return for follow-up 4 weeks ago and felt that the 10 mg dose was not doing well for her.  Orders were placed to increase her to the 20 mg dose daily.  She did run into issues with her insurance coverage where they would only cover the cost of the 10 mg daily dose.  Patient  continued to take the 10 mg dose daily instead of the 20 mg dose and she would like to continue with the 10 mg dose only due to her insurance coverage.  Overall, she feels that her mood is much improved, she feels she is socializing more with friends, her appetite is stable and she is experiencing less issues with insomnia.  She feels she is not worrying as much about school issues.  She has no thoughts of self-harm today, she is more laid back and is smiling today throughout the visit.  We did review her previous depression and anxiety screening results, PHQ 9 score is 13 today, previously 16.  Torres 7 score today is 11, previously 13.  She was also inquiring about restarting her MonoNessa oral birth control.  She was previously taking this medication several months ago and then stopped.  It was after that time that she ran into having issues with amenorrhea and feeling more depressed.  She had several lab studies performed to rule out any hormonal imbalances.  She was evaluated by GYN who stated that this was normal for her to experience this and did not feel that further testing was necessary at this time.  She did get a period this month on the 13th, she states that it was light flow and lasted 4 days.  I did discuss the role of estrogen regarding menstrual regulation and mood control, patient will continue with her MonoNessa starting today.  Chief Complaint   Patient presents with     Follow-up     Med discussion         Patient Active Problem List   Diagnosis     Amenorrhea     Anxiety     Depressed     Mood swings     Flat feet, bilateral     Fatigue, unspecified type     Acute bilateral low back pain without sciatica       Current Outpatient Prescriptions   Medication Sig Dispense Refill     escitalopram oxalate (LEXAPRO) 10 MG tablet Take 1 tablet (10 mg total) by mouth daily. 90 tablet 1     norgestimate-ethinyl estradiol (MONONESSA, 28,) 0.25-35 mg-mcg per tablet Take 1 tablet by mouth daily. 84 tablet 3      No current facility-administered medications for this visit.        History   Smoking Status     Never Smoker   Smokeless Tobacco     Never Used       REVIEW OF SYSTEMS: Denies excessive worries, restlessness, on edge, easily fatigues, irritability, disturbed sleep, decreased concentration, muscle tension, fear of health.      TOBACCO USE:  History   Smoking Status     Never Smoker   Smokeless Tobacco     Never Used     Social History     Social History     Marital status: Single     Spouse name: N/A     Number of children: N/A     Years of education: 13     Occupational History     student      Social History Main Topics     Smoking status: Never Smoker     Smokeless tobacco: Never Used     Alcohol use No     Drug use: No     Sexual activity: No      Comment: has never been sexually active     Other Topics Concern     Not on file     Social History Narrative         OBJECTIVE:            Vitals:    12/26/17 1222   BP: 92/52   Pulse: 78   Resp: 14   Temp: 97.3  F (36.3  C)   SpO2: 100%     Weight: 127 lb (57.6 kg)  Wt Readings from Last 3 Encounters:   12/26/17 127 lb (57.6 kg) (50 %, Z= -0.01)*   11/28/17 128 lb (58.1 kg) (52 %, Z= 0.04)*   10/26/17 130 lb (59 kg) (56 %, Z= 0.15)*     * Growth percentiles are based on CDC 2-20 Years data.     Body mass index is 24 kg/(m^2).        Physical Exam:  GENERAL APPEARANCE: A&A, NAD, well hydrated, well nourished  NECK: Supple, without lymphadenopathy, no thyroid mass, no JVD, no bruit  CV: RRR, no M/G/R, no edema   LUNGS: CTAB, normal respiratory effort  ABDOMEN: S&NT, no masses, no organomegaly, BS present x4   SKIN:  Normal skin turgor, no lesions/rashes, warm and dry   PSYCHIATRIC;  Mood appropriate, memory intact, good eye contact, engaged in conversation, smiling

## 2021-06-16 NOTE — PROGRESS NOTES
1. Anxiety  FLUoxetine (PROZAC) 20 MG capsule    Ambulatory Referral to Integrated Primary Care/Mental Health   2. Binge eating disorder  FLUoxetine (PROZAC) 20 MG capsule    Ambulatory Referral to Integrated Primary Care/Mental Health   3. Depression, unspecified depression type  FLUoxetine (PROZAC) 20 MG capsule    Ambulatory Referral to [facility]/Mental Health     Med list reconciled      ASSESSMENT/PLAN:     The following are part of a depression follow up plan for the patient:  coping support assessment and emotional support assessment    1. Binge eating disorder    - FLUoxetine (PROZAC) 20 MG capsule; Take 1 capsule (20 mg total) by mouth daily.  Dispense: 90 capsule; Refill: 1  - Ambulatory Referral to Integrated Primary Care/Mental Health    2. Anxiety    - FLUoxetine (PROZAC) 20 MG capsule; Take 1 capsule (20 mg total) by mouth daily.  Dispense: 90 capsule; Refill: 1  - Ambulatory Referral to Integrated Primary Care/Mental Health    3. Depression, unspecified depression type    - FLUoxetine (PROZAC) 20 MG capsule; Take 1 capsule (20 mg total) by mouth daily.  Dispense: 90 capsule; Refill: 1  - Ambulatory Referral to [facility] Primary [facility]/Mental Health      There are no Patient Instructions on file for this visit.  Medications Discontinued During This Encounter   Medication Reason     FLUoxetine (PROZAC) 20 MG capsule Reorder     Return to clinic in 6 months for next anxiety and depression follow-up.  Orders placed for patient to begin counseling.    The visit lasted a total of 25 minutes face to face with the patient.  Over 50% of the time spent counseling and educating the patient about above content.      Maira Byrd NP          SUBJECTIVE:  Liv Pryor is a 17 y.o. female who presents for follow-up for anxiety, depression and intermittent binge eating.  Previously was taking Lexapro 10 mg daily, medication discontinued and patient initiated on fluoxetine 20 mg daily.  She  states she feels much better on the current fluoxetine dosage.  Her mood is much more improved, she is able to control her binge eating and feels less irritable.  She has no difficulty with falling asleep, she does wake up several times during the night.  She denies any dry mouth, GI symptoms increased anxiety or depression or thoughts of self-harm.  We did discuss options for introducing mental health counseling into her regimen.  Patient is finally open to this, orders placed for [place] mental health counseling today. Vital are stable. ANGIE-7: 7, PHQ-9: 10 today, improved from previous scores.   Chief Complaint   Patient presents with     Medication Management         Patient Active Problem List   Diagnosis     Amenorrhea     Anxiety     Depressed     Mood swings     Flat feet, bilateral     Fatigue, unspecified type     Acute bilateral low back pain without sciatica     Binge eating disorder     On SSRI therapy     Increased appetite       Current Outpatient Prescriptions   Medication Sig Dispense Refill     FLUoxetine (PROZAC) 20 MG capsule Take 1 capsule (20 mg total) by mouth daily. 90 capsule 1     norgestimate-ethinyl estradiol (MONONESSA, 28,) 0.25-35 mg-mcg per tablet Take 1 tablet by mouth daily. 84 tablet 3     No current facility-administered medications for this visit.        History   Smoking Status     Never Smoker   Smokeless Tobacco     Never Used       REVIEW OF SYSTEMS: Decrease in excessive worries, restlessness, on edge, easily fatigues, irritability, disturbed sleep, decreased concentration, muscle tension, fear of health.      TOBACCO USE:  History   Smoking Status     Never Smoker   Smokeless Tobacco     Never Used     Social History     Social History     Marital status: Single     Spouse name: N/A     Number of children: N/A     Years of education: 13     Occupational History     student      Social History Main Topics     Smoking status: Never Smoker     Smokeless tobacco: Never Used      Alcohol use No     Drug use: No     Sexual activity: No      Comment: has never been sexually active     Other Topics Concern     Not on file     Social History Narrative         OBJECTIVE:            Vitals:    02/20/18 1252   BP: 98/60   Pulse: 80   Resp: 16     Weight: 132 lb (59.9 kg)  Wt Readings from Last 3 Encounters:   02/20/18 132 lb (59.9 kg) (58 %, Z= 0.20)*   01/23/18 131 lb (59.4 kg) (57 %, Z= 0.16)*   12/26/17 127 lb (57.6 kg) (50 %, Z= -0.01)*     * Growth percentiles are based on [facility] 2-20 Years data.     Body mass index is 24.94 kg/(m^2).        Physical Exam:  GENERAL APPEARANCE: A&A, NAD, well hydrated, well nourished  NECK: Supple, without lymphadenopathy, no thyroid mass, no JVD, no bruit  CV: RRR, no M/G/R   LUNGS: CTAB, normal respiratory effort  ABDOMEN: S&NT, no masses, no organomegaly, BS present x4   SKIN:  Normal skin turgor, no lesions/rashes, warm and dry   PSYCHIATRIC;  Mood appropriate, memory intact, good eye contact, engaged in conversation

## 2021-06-16 NOTE — PROGRESS NOTES
1. Plantar wart         ASSESSMENT/PLAN:     The following high BMI interventions were performed this visit: encouragement to exercise and weight monitoring    1. Plantar wart    -wart removal performed in clinic    Return to clinic for repeat wart removal if she does not feel the wart is gone in the next 4-6 weeks.    The visit lasted a total of 40 minutes face to face with the patient.  Over 50% of the time spent counseling and educating the patient about above content.      Maira Byrd NP          SUBJECTIVE:  Liv Pryor is a 17 y.o. female who presents for removal of her plantar wart.  Plantar wart located on the right foot plantar service that initially started 5 years ago.  She states the wart has been persistent and has remained the same size.  She does note an achy sensation when she applies direct pressure to the wart.  She has no difficulty with ambulation and she does not experience any pain when she is ambulating or wearing shoe.  Relieving factors: None, she has not tried any over-the-counter topical medications for her plantar wart.  She is rating her wart pain is 0 out of 10 today.  Wart debrided with a #15 scalpel, liquid nitrogen applied to plantar wart ×6.  Total of 2 warts debrided and frozen today.  Vital signs are stable, she does have a low-grade temperature today.  Site was covered at completion of wart removal.  She should return to the clinic in 4-6 weeks if wart is persistent.  Chief Complaint   Patient presents with     Procedure     Wart Removal         Patient Active Problem List   Diagnosis     Amenorrhea     Anxiety     Depressed     Mood swings     Flat feet, bilateral     Fatigue, unspecified type     Acute bilateral low back pain without sciatica     Binge eating disorder     On SSRI therapy     Increased appetite       Current Outpatient Prescriptions   Medication Sig Dispense Refill     FLUoxetine (PROZAC) 20 MG capsule Take 1 capsule (20 mg total) by mouth daily. 90  capsule 1     norgestimate-ethinyl estradiol (MONONESSA, 28,) 0.25-35 mg-mcg per tablet Take 1 tablet by mouth daily. 84 tablet 3     No current facility-administered medications for this visit.        History   Smoking Status     Never Smoker   Smokeless Tobacco     Never Used       REVIEW OF SYSTEMS: Denies trauma, locking, clicking, giving away, fevers, chills, sweating, rash or warmth.      TOBACCO USE:  History   Smoking Status     Never Smoker   Smokeless Tobacco     Never Used     Social History     Social History     Marital status: Single     Spouse name: N/A     Number of children: N/A     Years of education: 13     Occupational History     student      Social History Main Topics     Smoking status: Never Smoker     Smokeless tobacco: Never Used     Alcohol use No     Drug use: No     Sexual activity: No      Comment: has never been sexually active     Other Topics Concern     Not on file     Social History Narrative         OBJECTIVE:            Vitals:    02/21/18 1258   BP: 104/60   Pulse: 78   Resp: 18   Temp: 99.2  F (37.3  C)     Weight: 134 lb 1.6 oz (60.8 kg)  Wt Readings from Last 3 Encounters:   02/21/18 134 lb 1.6 oz (60.8 kg) (61 %, Z= 0.29)*   02/20/18 132 lb (59.9 kg) (58 %, Z= 0.20)*   01/23/18 131 lb (59.4 kg) (57 %, Z= 0.16)*     * Growth percentiles are based on CDC 2-20 Years data.     Body mass index is 25.34 kg/(m^2).        Physical Exam:  GENERAL APPEARANCE: A&A, NAD, well hydrated, well nourished  NECK: Supple, without lymphadenopathy, no thyroid mass, no JVD, no bruit  CV: RRR, no M/G/R , no edema  LUNGS: CTAB, normal respiratory effort  ABDOMEN: S&NT, no masses, no organomegaly, BS present x4   EXTREMITY: Right foot plantar surface with 0.5 x 0.5 cm plantar wart, wart debrided and frozen.  No redness or swelling around the area, she is able to ambulate without difficulty  SKIN:  Normal skin turgor, no lesions/rashes, warm and dry

## 2021-06-19 NOTE — LETTER
Letter by Maira Byrd NP at      Author: Maira Byrd NP Service: -- Author Type: --    Filed:  Encounter Date: 4/15/2019 Status: (Other)         April 15, 2019     Patient: Liv Pryor   YOB: 2000   Date of Visit: 4/15/2019       To Whom It May Concern:    It is my medical opinion that Liv Pryor may return to work on Wednesday April 17, 2019 no restrictions.  Liv is unable to return to work until 04/17/19 due to strep throat infection. She was seen in clinic and started on antibiotic therapy. She is infectious until she is on the medication for a full 24 hours.     If you have any questions or concerns, please don't hesitate to call.    Sincerely,        Electronically signed by Maira Byrd NP

## 2021-06-20 NOTE — PROGRESS NOTES
1. Cloudy urine  Urinalysis Macroscopic   2. Unprotected sex  CANCELED: Chlamydia trachomatis & Neisseria gonorrhoeae, Amplified Detection    CANCELED: Pregnancy, Urine   3. Vaginal discharge  Wet Prep, Vaginal   4. Plantar warts     5. Need for vaccination  Influenza, Seasonal,Quad Inj, 36+ MOS   6. Acute cystitis with hematuria  Culture, Urine         ASSESSMENT/PLAN:     The following high BMI interventions were performed this visit: encouragement to exercise and weight monitoring    1. Cloudy urine    - Urinalysis Macroscopic    2. Unprotected sex    -declined pregnancy and STD testing today, currently mestruating    3. Vaginal discharge    - Wet Prep, Vaginal    4. Plantar warts    -debrided and frozen today, see note    5. Need for vaccination    - Influenza, Seasonal,Quad Inj, 36+ MOS    There are no Patient Instructions on file for this visit.  There are no discontinued medications.  Return if symptoms worsen or fail to improve, for vaginitis.    The visit lasted a total of 40 minutes face to face with the patient.  Over 50% of the time spent counseling and educating the patient about above content.      Maira Byrd NP          SUBJECTIVE:  Liv Pryor is a 18 y.o. female who presents for removal and freezing of her plantar warts on the right foot.  This will be the third having this procedure, plantar wart is extremely large, nickel sized.  She continues to have mild discomfort with ambulation and wearing shoes.  Wart is extremely thick, site was debrided using a 15 blade scalpel, site was prepped prior to debridement with alcohol.  Site was frozen ×6 using liquid nitrogen, mild bleeding noted, patient tolerated procedure without difficulty.     Patient recently engaged in sexual intercourse for the first time with a male partner.  They did not use protection.  She is not concerned about pregnancy, she is currently menstruating.  She declined STD testing today.  The male partner has had other  partners in the past.  Since engaging in intercourse, she has been experiencing some vaginal discharge with odor for the last several days.  She is unsure if this is related to her recent intercourse or possible UTI since she has been experiencing some mild dysuria and cloudy urine.  Vaginal discharge with odor has a fishy scent to it.  She had recently picked up her oral contraception and had been on the pill for 1 week prior to engaging in sexual intercourse.  First day of her last period was September 14, 2018 and she continues to have bleeding today.  Denies any fatigue, nausea or breast tenderness today.  Her vital signs are stable.  She would like her influenza vaccination today.  Chief Complaint   Patient presents with     Procedure     Wart RT foot     Urinary Tract Infection     x2 days - ordor, cloudy         Patient Active Problem List   Diagnosis     Amenorrhea     Anxiety     Depressed     Mood swings (H)     Flat feet, bilateral     Fatigue, unspecified type     Acute bilateral low back pain without sciatica     Binge eating disorder     On SSRI therapy     Increased appetite     Plantar warts     Encounter for other general counseling or advice on contraception     Acne     Vaginal discharge     Unprotected sex     Cloudy urine       Current Outpatient Prescriptions   Medication Sig Dispense Refill     norgestimate-ethinyl estradiol (MONONESSA, 28,) 0.25-35 mg-mcg per tablet Take 1 tablet by mouth daily. 84 tablet 0     No current facility-administered medications for this visit.        History   Smoking Status     Never Smoker   Smokeless Tobacco     Never Used       REVIEW OF SYSTEMS: Denies  frequency, urgency, fevers, chills, back pain, nausea, vomiting or abdominal pain.      TOBACCO USE:  History   Smoking Status     Never Smoker   Smokeless Tobacco     Never Used     Social History     Social History     Marital status: Single     Spouse name: N/A     Number of children: N/A     Years of  education: 13     Occupational History     student      Social History Main Topics     Smoking status: Never Smoker     Smokeless tobacco: Never Used     Alcohol use No     Drug use: No     Sexual activity: No      Comment: has never been sexually active     Other Topics Concern     Not on file     Social History Narrative         OBJECTIVE:            Vitals:    09/20/18 1434   BP: 100/60   Pulse: 85   Resp: 12   Temp: 98.1  F (36.7  C)   SpO2: 98%     Weight: 142 lb (64.4 kg)  Wt Readings from Last 3 Encounters:   09/20/18 142 lb (64.4 kg)   08/28/18 143 lb (64.9 kg)   06/12/18 143 lb (64.9 kg) (73 %, Z= 0.61)*     * Growth percentiles are based on CDC 2-20 Years data.     Body mass index is 26.83 kg/(m^2).        Physical Exam:  GENERAL APPEARANCE: A&A, NAD, well hydrated, well nourished  CV: RRR, no M/G/R   LUNGS: CTAB, normal respiratory effort  ABDOMEN: S&NT, no masses, no organomegaly, BS present x4, no CVA tenderness  GENITAL: External genitalia with mild erythema, no lesions or discharge.  Internal exam reveals moderate amount of thick, white discharge.  Cervix is pink and smooth, bleeding noted due to menstrual cycle, no masses or lesions.  Uterus intact.  EXTREMITY: Right plantar surface with nickel sized plantar wart, extremely thick, no redness noted around the wart.  2 small plantar warts surrounding the large wart.   SKIN:  Normal skin turgor, no lesions/rashes, warm and dry

## 2021-06-21 NOTE — PROGRESS NOTES
Chief Complaint   Patient presents with     Urinary Tract Infection     Pt c/o urgency, frequency, odor, burning x 2 days       HPI: Very pleasant 18-year-old female who works at the [employer] presents today with dysuria frequency and one occasion of incontinence for 2 days in duration of mild intensity.  She recently became sexually active.  She had one urinary tract infection several weeks ago.  She denies hematuria.    ROS: No fever chills or CVA tenderness.    SH:    reports that she has never smoked. She has never used smokeless tobacco. She reports that she does not drink alcohol or use illicit drugs.      FH: The Patient's family history includes No Medical Problems in her brother, father, mother, and sister.     Meds:  Liv has a current medication list which includes the following prescription(s): norgestimate-ethinyl estradiol and ciprofloxacin hcl.    O:  BP 90/62  Pulse 81  Resp 16  Wt 144 lb (65.3 kg)  SpO2 99%  BMI 27.21 kg/m2  Alert conversant no acute distress  No CVA tenderness  No tenderness to palpation of the bladder    UA positive for WBCs and RBCs    A/P:   1. Urinary urgency  - Urinalysis  - Culture, Urine due to repeat UTI    2. UTI (urinary tract infection)  - ciprofloxacin HCl (CIPRO) 250 MG tablet; Take 1 tablet (250 mg total) by mouth 2 (two) times a day for 7 days.  Dispense: 14 tablet; Refill: 0

## 2021-06-23 NOTE — PROGRESS NOTES
1. Vaginal discharge  Wet Prep, Vaginal    Chlamydia trachomatis & Neisseria gonorrhoeae, Amplified Detection   2. Encounter for other general counseling or advice on contraception  norgestimate-ethinyl estradiol (ESTARYLLA) 0.25-35 mg-mcg per tablet         ASSESSMENT/PLAN:     The following high BMI interventions were performed this visit: encouragement to exercise and weight monitoring    1. Vaginal discharge    - Wet Prep, Vaginal  - Chlamydia trachomatis & Neisseria gonorrhoeae, Amplified Detection    2. Encounter for other general counseling or advice on contraception    - norgestimate-ethinyl estradiol (ESTARYLLA) 0.25-35 mg-mcg per tablet; Take 1 tablet by mouth daily.  Dispense: 84 tablet; Refill: 2      There are no Patient Instructions on file for this visit.  Medications Discontinued During This Encounter   Medication Reason     ESTARYLLA 0.25-35 mg-mcg per tablet Reorder     Return in about 7 weeks (around 3/1/2019) for depression, anxiety., wart removal        Maira Byrd NP          SUBJECTIVE:  Liv Pryor is a 18 y.o. female who presents for follow-up for oral contraception and acute vaginitis symptoms.  She is liking the type of birth control she is on, last menstrual period was on Zi, lasted 2-3 days and light in flow.  She has not been sexually active since October.  First time she ever engaged in sexual intercourse was in August.  She was treated in November for a urinary tract infection that grew out staph.  Symptoms have completely resolved since that time.  She continues to experience vaginal discharge with a foul odor.  Onset of symptoms started before she was sexually active in August.  Discharge is milky and white.  She has been experiencing some lower pelvic pain with this as well greater on the right side.  She is struggling with constipation issues however.  She has not had a bowel movement in the last 5 days and is starting to feel nauseated from it.  She does wipe  back to front, she does not take baths or if she has not used any new soaps or hygiene products.  She has not tried any over-the-counter remedies for her vaginal discharge issue.  She denies any fevers, chills, vomiting or low back pain today.  She is rating her lower abdominal pain a 3 out of 10.   Chief Complaint   Patient presents with     Follow Up     BC     Vaginal Discharge     Heavy discharge that has odor         Patient Active Problem List   Diagnosis     Amenorrhea     Anxiety     Depressed     Mood swings     Flat feet, bilateral     Fatigue, unspecified type     Acute bilateral low back pain without sciatica     Binge eating disorder     On SSRI therapy     Increased appetite     Plantar warts     Encounter for other general counseling or advice on contraception     Acne     Vaginal discharge     Unprotected sex     Cloudy urine       Current Outpatient Medications   Medication Sig Dispense Refill     norgestimate-ethinyl estradiol (ESTARYLLA) 0.25-35 mg-mcg per tablet Take 1 tablet by mouth daily. 84 tablet 2     No current facility-administered medications for this visit.        Social History     Tobacco Use   Smoking Status Never Smoker   Smokeless Tobacco Never Used       REVIEW OF SYSTEMS: Denies dysuria, frequency, urgency, fevers, chills, back pain,  vomiting     TOBACCO USE:  Social History     Tobacco Use   Smoking Status Never Smoker   Smokeless Tobacco Never Used     Social History     Socioeconomic History     Marital status: Single     Spouse name: Not on file     Number of children: Not on file     Years of education: 13     Highest education level: Not on file   Social Needs     Financial resource strain: Not on file     Food insecurity - worry: Not on file     Food insecurity - inability: Not on file     Transportation needs - medical: Not on file     Transportation needs - non-medical: Not on file   Occupational History     Occupation: student   Tobacco Use     Smoking status: Never  Smoker     Smokeless tobacco: Never Used   Substance and Sexual Activity     Alcohol use: No     Drug use: No     Sexual activity: No     Comment: has never been sexually active   Other Topics Concern     Not on file   Social History Narrative     Not on file         OBJECTIVE:            Vitals:    01/11/19 1312   BP: 104/62   Pulse: 82   Resp: 12   Temp: 97.8  F (36.6  C)   SpO2: 99%     Weight: 136 lb (61.7 kg)    Wt Readings from Last 3 Encounters:   01/11/19 136 lb (61.7 kg)   11/02/18 144 lb (65.3 kg)   09/20/18 142 lb (64.4 kg)     Body mass index is 25.7 kg/m .        Physical Exam:  GENERAL APPEARANCE: A&A, NAD, well hydrated, well nourished  CV: RRR, no M/G/R   LUNGS: CTAB, normal respiratory effort  ABDOMEN: Soft, mild tenderness with palpation of the suprapubic region, mild bloating noted, no rebound or guarding, no masses, no organomegaly, BS present x4, no CVA tenderness  GENITAL: Moderate amount of thick, white discharge on the external genitalia.  Large amount of thin, white discharge in the vaginal canal covering the cervix.  No masses, abnormal bleeding or lesions noted.  Mild fishy odor noted  SKIN:  Normal skin turgor, no lesions/rashes, warm and dry

## 2021-06-26 NOTE — PROGRESS NOTES
Progress Notes by Maira Byrd NP at 8/28/2018 12:00 PM     Author: Maira Byrd NP Service: -- Author Type: Nurse Practitioner    Filed: 8/28/2018 12:54 PM Encounter Date: 8/28/2018 Status: Signed    : Maira Byrd NP (Nurse Practitioner)       1. Mood swings (H)  norgestimate-ethinyl estradiol (MONONESSA, 28,) 0.25-35 mg-mcg per tablet   2. Depression, unspecified depression type     3. Anxiety     4. Encounter for other general counseling or advice on contraception  norgestimate-ethinyl estradiol (MONONESSA, 28,) 0.25-35 mg-mcg per tablet   5. Acne  norgestimate-ethinyl estradiol (MONONESSA, 28,) 0.25-35 mg-mcg per tablet   6. Plantar warts           ASSESSMENT/PLAN:     The following are part of a depression follow up plan for the patient:  coping support assessment and emotional support assessment    1. Mood swings (H)    - norgestimate-ethinyl estradiol (MONONESSA, 28,) 0.25-35 mg-mcg per tablet; Take 1 tablet by mouth daily.  Dispense: 84 tablet; Refill: 0    2. Depression, unspecified depression type    -PHQ-9: 11    3. Anxiety    -ANGIE-7: 9    4. Encounter for other general counseling or advice on contraception    - norgestimate-ethinyl estradiol (MONONESSA, 28,) 0.25-35 mg-mcg per tablet; Take 1 tablet by mouth daily.  Dispense: 84 tablet; Refill: 0    5. Acne    - norgestimate-ethinyl estradiol (MONONESSA, 28,) 0.25-35 mg-mcg per tablet; Take 1 tablet by mouth daily.  Dispense: 84 tablet; Refill: 0    6. Plantar warts    -debridement and freezing performed in clinic, see note      Patient Instructions       Birth Control Choices  Birth control keeps you from getting pregnant during sex. There are many types of birth control. Some are more effective than others. New types are being tested all the time. Your healthcare provider can help you decide which type of birth control is best for you. But no matter which type you choose, you and your partner must use it the right way each  time you have sex. Some of the most common types are described below.  Condom  A condom is a thin covering that fits over the penis. (The female condom fits inside the vagina.) A condom catches sperm that come out of the penis during sex.  Spermicide  Spermicide is a gel, foam, cream, tablet, or sponge (although the sponge has barrier properties in addition to spermicidal properties). It is put in the vagina before sex to kill sperm.  Diaphragm and cervical cap  Diaphragms and cervical caps are round rubber cups that keep sperm out of the uterus. They also hold spermicide in place.  Intrauterine device (IUD)  An IUD is a small device that is placed in the uterus by a healthcare provider to prevent pregnancy.  The pill  The birth control pill is taken daily. It contains hormones that stop a womans body from releasing an egg each month.  Other hormones  Hormones that stop a womans egg from being released each month can be delivered in other ways. These include injection, implant, patch, or vaginal ring.  Other choices  Here are additional birth control methods:    Male sterilization (vasectomy) is surgery that ties off or cuts the tubes called the vas deferens in the testes. This is done so sperm cannot come out when the man ejaculates.    Female sterilization is surgery to block or cut the woman's fallopian tubes. It can be done by placing an instrument into the uterus (hysteroscopy) to insert small coils into the fallopian tubes (Essure). It can also be done through the belly (laparoscopy) to block the tubes or remove part or all of the tubes.    Withdrawal method is when the male doesn't ejaculate into the vagina, but rather withdraws his penis just before he ejaculates.    Fertility awareness method is when a woman keeps track of her fertile days. She only has intercourse at times when she is not likely to get pregnant.  Emergency contraception (EC)  Emergency contraception can help prevent pregnancy after  unprotected sex. Hormone pills (morning after pills) are available over the counter to anyone. A second type of EC, a copper IUD, needs to be inserted by a trained healthcare provider. Either type of EC can be used up to 5 days after sex, but it should be taken as soon as possible. The sooner it is used after unprotected sex, the more likely it is to be effective. EC will not work if youre already pregnant.  Things to consider  Think about the following:    Choose a type of birth control that is easy for you to use.    Read the package and follow your health care provider's instructions to learn to use your birth control the right way.    Most forms of birth control do not protect you from sexually transmitted infections (STIs). To protect against STIs, always use a latex condom. If you are allergic to latex, a nonlatex condom may provide some protection.   Date Last Reviewed: 12/1/2016 2000-2017 [employer]. [address] [place] [address]. All rights reserved. This information is not intended as a substitute for professional medical care. Always follow your healthcare professional's instructions.            If you have further questions regarding your plan of care, please call your provider at Phone: [phone]    If you were prescribed medication, be sure to fill your prescription and follow medication directions    If you experience any medication side effects or minor reactions, please contact us at Phone: [phone]    If you or your family member have suicidal thoughts, contact 911 or go to your nearest Emergency Room    [facility]  Adult [phone]  Child: [phone] [facility]  Adult: [phone]  Child: [phone] [facility]  [facility]   Adult/Child: [phone]   [facility]  Adult:  [phone]  Child:  [phone]     National Suicide Prevention Line:  1-859.773.1893    [facility]    [address]  [facility] [place]   [address]  [phone]      Mobile Crisis Team  [facility]    Adults, 18 and older  COPE- [phone]    Children, ages 17 and younger:  Child Crisis- [phone] Mobile Crisis Team  [facility]    24/7 Mobile Team and Crisis Line  [phone]                               Depression: Tips to Help Yourself    As your healthcare providers help treat your depression, you can also help yourself. Keep in mind that your illness affects you emotionally, physically, mentally, and socially. So full recovery will take time. Take care of your body and your soul, and be patient with yourself as you get better.  Self-care    Educate yourself. Read about treatment and medicine options. If you have the energy, attend local conferences or support groups. Keep a list of useful websites and helpful books and use them as needed. This illness is not your fault. Dont blame yourself for your depression.    Manage early symptoms. If you notice symptoms returning, experience triggers, or identify other factors that may lead to a depressive episode, get help as soon as possible. Ask trusted friends and family to monitor your behavior and let you know if they see anything of concern.    Work with your provider. Find a provider you can trust. Communicate honestly with that person and share information on your treatment for depression and your reaction to medicines.    Be prepared for a crisis. Know what to do if you experience a crisis. Keep the phone number of a crisis hotline and know the location of your community's urgent care centers and the closest emergency department.    Hold off on big decisions. Depression can cloud your judgment. So wait until you feel better before making major life decisions, such as changing jobs, moving, or getting  or .    Be patient. Recovering from depression is a process. Dont be discouraged if it takes some time to feel  better.    Keep it simple. Depression saps your energy and concentration. So you wont be able to do all the things you used to do. Set small goals and do what you can.    Be with others. Dont isolate yourself--youll only feel worse. Try to be with other people. And take part in fun activities when you can. Go to a movie, ballgame, Mu-ism service, or social event. Talk openly with people you can trust. And accept help when its offered.  Take care of your body  People with depression often lose the desire to take care of themselves. That only makes their problems worse. During treatment and afterward, make a point to:    Exercise. Its a great way to take care of your body. And studies have shown that exercise helps fight depression.    Avoid drugs and alcohol. These may ease the pain in the short term. But theyll only make your problems worse in the long run.    Get relief from stress. Ask your healthcare provider for relaxation exercises and techniques to help relieve stress.    Eat right. A balanced and healthy diet helps keep your body healthy.  Date Last Reviewed: 1/1/2017 2000-2017 [employer]. [address] [place] [address]. All rights reserved. This information is not intended as a substitute for professional medical care. Always follow your healthcare professional's instructions.          Medications Discontinued During This Encounter   Medication Reason   ? FLUoxetine (PROZAC) 20 MG capsule Therapy completed     Return in about 3 months (around 11/28/2018) for birth control.         Maira Byrd NP          SUBJECTIVE:  Liv Pryor is a 18 y.o. female who presents for follow-up for her anxiety, depression and mood changes.  Patient stopped taking her Prozac several months ago.  She felt it was no longer helpful for her symptoms and decided to be medication free.  She had previously been on Lexapro for her symptoms and felt that was also ineffective.  She has no  thoughts of self-harm or plans for suicide today.  She would like to continue to remain off the medications even though her screening scores are a little elevated today.  She is not interested in counseling.  She does still struggle with chronic worry, she has not noticed any significant lability with her mood swings.  She does struggle with falling asleep at night but when she is asleep she has no problems staying asleep.  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  Coping strategies for her anxiety and depression include music therapy and staying busy.  She denies illicit drug use, does not drink alcohol.    Acne: Patient would like to restart her oral contraception today.  She had been using MonoNessa in the past but stopped because she had gained some weight.  Her acne has become a little more severe on the chin and cheeks of her face.  Last menstrual period August 12, menstrual cycles are every 30 days, moderate in flow, typically last 5-7 days.  She is not sexually active.  She would like to see if the oral contraception is also helpful for her irritability and mood swings as well.    Plantar wart: Continues to bother her on the right plantar surface.  She has had debridement freezing performed twice this summer, the wart is getting better, however it seems to bother her when there is any direct pressure applied to the site.  Because of the size of the wart and the depth of the wart, patient had been informed at previous office visits that the debridement and freezing may need to be performed multiple times before she has complete resolution of the wart.  Wart was prepped and cleaned, debrided using a 15 blade surgical scalpel, site was frozen ×5 and covered with a Band-Aid after.  No bleeding noted.  Patient tolerated procedure without difficulty.    Vital signs stable today.   Chief Complaint   Patient presents with   ? Follow-up     Wart, BC and depression screen          Patient Active Problem List   Diagnosis   ? Amenorrhea   ? Anxiety   ? Depressed   ? Mood swings (H)   ? Flat feet, bilateral   ? Fatigue, unspecified type   ? Acute bilateral low back pain without sciatica   ? Binge eating disorder   ? On SSRI therapy   ? Increased appetite   ? Plantar warts   ? Encounter for other general counseling or advice on contraception   ? Acne       Current Outpatient Prescriptions   Medication Sig Dispense Refill   ? norgestimate-ethinyl estradiol (MONONESSA, 28,) 0.25-35 mg-mcg per tablet Take 1 tablet by mouth daily. 84 tablet 0     No current facility-administered medications for this visit.        History   Smoking Status   ? Never Smoker   Smokeless Tobacco   ? Never Used       REVIEW OF SYSTEMS: Denies mood swings, excessive sadness, crying episodes,  anhedonia, irritability, feelings of extreme energy, suicidal/homicidal ideations.        TOBACCO USE:  History   Smoking Status   ? Never Smoker   Smokeless Tobacco   ? Never Used     Social History     Social History   ? Marital status: Single     Spouse name: N/A   ? Number of children: N/A   ? Years of education: 13     Occupational History   ? student      Social History Main Topics   ? Smoking status: Never Smoker   ? Smokeless tobacco: Never Used   ? Alcohol use No   ? Drug use: No   ? Sexual activity: No      Comment: has never been sexually active     Other Topics Concern   ? Not on file     Social History Narrative         OBJECTIVE:            Vitals:    08/28/18 1152   BP: 94/52   Pulse: 75   Resp: 12   Temp: 98.2  F (36.8  C)   SpO2: 99%     Weight: 143 lb (64.9 kg)  Wt Readings from Last 3 Encounters:   08/28/18 143 lb (64.9 kg)   06/12/18 143 lb (64.9 kg) (73 %, Z= 0.61)*   02/21/18 134 lb 1.6 oz (60.8 kg) (61 %, Z= 0.29)*     * Growth percentiles are based on CDC 2-20 Years data.     Body mass index is 27.02 kg/(m^2).        Physical Exam:  GENERAL APPEARANCE: A&A, NAD, well hydrated, well nourished  CV: RRR, no  M/G/R   LUNGS: CTAB, normal respiratory effort  ABDOMEN: S&NT, no masses, no organomegaly, BS present x4   EXTREMITY: no edema, dime sized plantar wart noted on the right plantar surface.  No redness, or drainage noted.  SKIN:  Normal skin turgor, no lesions/rashes, warm and dry, small, patches of acne scattered on chin and cheeks, acne is mild.  No comedone formation noted.  PSYCHIATRIC;  Mood appropriate, memory intact, good eye contact, engaged in conversation

## 2021-06-26 NOTE — PROGRESS NOTES
Progress Notes by Maira Byrd NP at 6/12/2018  4:00 PM     Author: Maira Byrd NP Service: -- Author Type: Nurse Practitioner    Filed: 6/13/2018 11:52 AM Encounter Date: 6/12/2018 Status: Signed    : Maira Byrd NP (Nurse Practitioner)       1. Plantar warts           ASSESSMENT/PLAN:     The following high BMI interventions were performed this visit: encouragement to exercise and weight monitoring    1. Plantar warts    -debridement with freezing performed in clinic (see note)    Patient Instructions     Plantar Warts  Warts are common skin growths that can appear anywhere on the body. Warts on the soles of the feet are called plantar warts. These warts are not a serious health problem. They usually go away without treatment. But plantar warts can be painful when you stand or walk. If this is the case, special cushions can help relieve pressure and pain. Drugstores carry these cushions and you can buy them without a prescription. If cushions do not work and the pain interferes with walking, the wart can be removed.  General care    Your healthcare provider may remove the plantar wart:  ? With prescription medications. These may be placed directly on the wart at each office visit. Or you may be sent home with the medication.  ? With a blade, or by freezing (cryotherapy), burning (electrocautery), or laser treatment.    You may be instructed to treat the wart yourself at home using an over-the-counter wart-removal medication (such as 40 percent salicylic acid). Apply the medication to the wart every day as directed. Avoid the healthy skin around the wart. In between applications, remove the dead wart tissue using the type of file suggested by your health care provider. You will likely need to repeat this process for several weeks to remove the entire wart.    Warts can spread from your foot to other parts of your body and to other people. Do not scratch or pick at the wart. Wash your  hands well before and after touching your warts.    Warts often come back, even after successful treatment. Return promptly for treatment of any new warts.  Follow-up care  Follow up with your health care provider, or as advised.  When to seek medical advice    Signs of infection (red streaks, pus, smelly or colored discharge, or fever) appear.    You experience heavy bleeding or bleeding that wont stop with light pressure.    The wart doesnt go away after several weeks of self-care.     New warts appear on feet, hands, or face.  Date Last Reviewed: 8/19/2015 2000-2017 [employer]. [address] [place] [address]. All rights reserved. This information is not intended as a substitute for professional medical care. Always follow your healthcare professional's instructions.          Medications Discontinued During This Encounter   Medication Reason   ? norgestimate-ethinyl estradiol (MONONESSA, 28,) 0.25-35 mg-mcg per tablet Side effects     Return if symptoms worsen or fail to improve.    The visit lasted a total of 25 minutes face to face with the patient.  Over 50% of the time spent counseling and educating the patient about above content.      Maira Byrd NP          SUBJECTIVE:  Liv Pryor is a 17 y.o. female who presents for debridement with freezing of her plantar wart.  This will be her second time having the procedure on the same area.  The wart is located on the plantar surface of the right foot.  It has been persistent for the last several months.  She describes it as a dull, achy sensation that is intermittent.  Intervening factors: Applying direct pressure, wearing shoes and walking.  Relieving factors: She did notice some relief of her plantar wart pain after her previous debridement and freezing.  She is rating the plantar wart pain a 5 out of 10 today.  # 15 blade scalpel used and the site was debrided.  Freezing performed ×6 to the site.  Beetle juice applied to  the top of the debrided area and the site was covered.  No bleeding was noted post procedure.  It will stable today, she will return to the clinic if she feels that the wart needs an additional debridement based on its large size.  Chief Complaint   Patient presents with   ? Follow-up     Wart treatment   ? Nevus     SHoulder and arm         Patient Active Problem List   Diagnosis   ? Amenorrhea   ? Anxiety   ? Depressed   ? Mood swings (H)   ? Flat feet, bilateral   ? Fatigue, unspecified type   ? Acute bilateral low back pain without sciatica   ? Binge eating disorder   ? On SSRI therapy   ? Increased appetite   ? Plantar warts       Current Outpatient Prescriptions   Medication Sig Dispense Refill   ? FLUoxetine (PROZAC) 20 MG capsule Take 1 capsule (20 mg total) by mouth daily. 90 capsule 1     No current facility-administered medications for this visit.        History   Smoking Status   ? Never Smoker   Smokeless Tobacco   ? Never Used       REVIEW OF SYSTEMS: Denies new soaps, detergents, lotions, makeup, foods, bedding, recent travel, sick contacts, fever or chills.      TOBACCO USE:  History   Smoking Status   ? Never Smoker   Smokeless Tobacco   ? Never Used     Social History     Social History   ? Marital status: Single     Spouse name: N/A   ? Number of children: N/A   ? Years of education: 13     Occupational History   ? student      Social History Main Topics   ? Smoking status: Never Smoker   ? Smokeless tobacco: Never Used   ? Alcohol use No   ? Drug use: No   ? Sexual activity: No      Comment: has never been sexually active     Other Topics Concern   ? Not on file     Social History Narrative         OBJECTIVE:            Vitals:    06/12/18 1547   BP: 90/64   Pulse: 91   Resp: 14   Temp: 98  F (36.7  C)   SpO2: 98%     Weight: 143 lb (64.9 kg)  Wt Readings from Last 3 Encounters:   06/12/18 143 lb (64.9 kg) (73 %, Z= 0.61)*   02/21/18 134 lb 1.6 oz (60.8 kg) (61 %, Z= 0.29)*   02/20/18 132 lb  (59.9 kg) (58 %, Z= 0.20)*     * Growth percentiles are based on CDC 2-20 Years data.     Body mass index is 27.02 kg/(m^2).        Physical Exam:  GENERAL APPEARANCE: A&A, NAD, well hydrated, well nourished  NECK: Supple, without lymphadenopathy, no thyroid mass, no JVD, no bruit  CV: RRR, no M/G/R   LUNGS: CTAB, normal respiratory effort  ABDOMEN: S&NT, no masses, no organomegaly, BS present x4   SKIN:  Normal skin turgor, no rashes, warm and dry. 1 x 1 cm plantar wart on plantar surface of right foot covered with large amount of thick skin.  Site was debrided using a 15 blade scalpel, freezing performed ×6.  Site was then covered with beetle juice and covered with Band-Aid.

## 2021-07-03 NOTE — ADDENDUM NOTE
Addendum Note by Maira Byrd NP at 9/20/2018  5:23 PM     Author: Maira Byrd NP Service: -- Author Type: Nurse Practitioner    Filed: 9/20/2018  5:23 PM Encounter Date: 9/20/2018 Status: Signed    : Maira Byrd NP (Nurse Practitioner)    Addended by: MAIRA BYRD on: 9/20/2018 05:23 PM        Modules accepted: Orders

## 2021-07-25 ENCOUNTER — HEALTH MAINTENANCE LETTER (OUTPATIENT)
Age: 21
End: 2021-07-25

## 2021-09-19 ENCOUNTER — HEALTH MAINTENANCE LETTER (OUTPATIENT)
Age: 21
End: 2021-09-19

## 2022-08-21 ENCOUNTER — HEALTH MAINTENANCE LETTER (OUTPATIENT)
Age: 22
End: 2022-08-21

## 2022-11-21 ENCOUNTER — HEALTH MAINTENANCE LETTER (OUTPATIENT)
Age: 22
End: 2022-11-21

## 2023-09-17 ENCOUNTER — HEALTH MAINTENANCE LETTER (OUTPATIENT)
Age: 23
End: 2023-09-17